# Patient Record
Sex: MALE | Race: WHITE
[De-identification: names, ages, dates, MRNs, and addresses within clinical notes are randomized per-mention and may not be internally consistent; named-entity substitution may affect disease eponyms.]

---

## 2018-08-04 ENCOUNTER — HOSPITAL ENCOUNTER (OUTPATIENT)
Dept: HOSPITAL 97 - 2ND | Age: 63
Setting detail: OBSERVATION
LOS: 1 days | Discharge: HOME | End: 2018-08-05
Attending: SURGERY | Admitting: SURGERY
Payer: COMMERCIAL

## 2018-08-04 VITALS — OXYGEN SATURATION: 93 %

## 2018-08-04 VITALS — BODY MASS INDEX: 33.2 KG/M2

## 2018-08-04 DIAGNOSIS — Z87.891: ICD-10-CM

## 2018-08-04 DIAGNOSIS — K80.00: Primary | ICD-10-CM

## 2018-08-04 LAB — UA COMPLETE W REFLEX CULTURE PNL UR: (no result)

## 2018-08-04 PROCEDURE — 81001 URINALYSIS AUTO W/SCOPE: CPT

## 2018-08-04 PROCEDURE — 0FT44ZZ RESECTION OF GALLBLADDER, PERCUTANEOUS ENDOSCOPIC APPROACH: ICD-10-PCS

## 2018-08-04 PROCEDURE — BF101ZZ FLUOROSCOPY OF BILE DUCTS USING LOW OSMOLAR CONTRAST: ICD-10-PCS

## 2018-08-04 PROCEDURE — 74300 X-RAY BILE DUCTS/PANCREAS: CPT

## 2018-08-04 PROCEDURE — 88304 TISSUE EXAM BY PATHOLOGIST: CPT

## 2018-08-04 RX ADMIN — SODIUM CHLORIDE, SODIUM LACTATE, POTASSIUM CHLORIDE, AND CALCIUM CHLORIDE SCH MLS: .6; .31; .03; .02 INJECTION, SOLUTION INTRAVENOUS at 17:00

## 2018-08-04 NOTE — RAD REPORT
EXAM DESCRIPTION:  RAD - Cholangiogram Oper-Xray Or - 8/4/2018 4:22 pm

 

CLINICAL HISTORY:  LAP KATHE WITH IOC GALLSTONES

 

COMPARISON:  ABDOMEN SINGLE VIEW dated 3/31/2014

 

FINDINGS:  Cystic duct injection was performed by operating surgeon. No retained stone is seen in the
 common duct.

 

Total fluoro time: 0.1 minutes.

 

IMPRESSION:  No evidence of retained common duct stone.

## 2018-08-04 NOTE — P.HP
Certification for Inpatient


Patient admitted to: Observation


With expected LOS: <2 Midnights


Patient will require the following post-hospital care: None


Practitioner: I am a practitioner with admitting privileges, knowledge of 

patient current condition, hospital course, and medical plan of care.


Services: Services provided to patient in accordance with Admission 

requirements found in Title 42 Section 412.3 of the Code of Federal Regulations





Patient History


Date of Service: 08/04/18


Reason for admission: Cholecystitis with cholelithiasis, biliary colic


History of Present Illness: 





This patient has had severe abdominal pain all day yesterday.  Been in the 

right upper quadrant.  Thought it was his heartburn.  Pain was unrelenting and 

he went to another facility for diagnosis and treatment.  He was found have on 

ultrasound a stone stuck in the neck of the gallbladder.


Allergies





HORSE/EQUINE PRODUCT DERIVA Allergy (Severe, Uncoded 08/04/18 09:06)


 Anaphylaxis





Home medications list reviewed: Yes


Home Medications: 








NK [No Home Meds]  08/04/18 








- Past Medical/Surgical History


Has patient received pneumonia vaccine in the past: No


Diabetic: No


-: GERD


-: STERNUM REPAIR AFTER IT SNAPPED IN HALF DURING WATER SKIING





- Social History


Smoking Status: Former smoker


Alcohol use: No


CD- Drugs: No


Caffeine use: Yes


Place of Residence: Home





Review of Systems


Unremarkable


Gastrointestinal: Other (The patient complains of esophageal reflux and 

heartburn for the last 8 years)





Physical Examination





- Vital Signs


Temperature: 97.4 F


Blood Pressure: 150/77


Pulse: 52


Respirations: 18


Pulse Ox (%): 95





- Physical Exam


General: Alert, Mild distress


HEENT: Sclerae nonicteric


Respiratory: Normal air movement


Cardiovascular: Normal S1 S2


Gastrointestinal: Other (Tender in the right upper quadrant)


External genitalia: Normal


Rectal: Deferred





- Studies





Elevated white cell count


Imagings Data: 





CT scan demonstrates large stone stuck in the neck of the gallbladder.





Assessment and Plan





- Plan





Patient has a stone stuck in his gallbladder the right upper quadrant on CT 

scan.  Has received pain medicine.  Pain is currently alleviated, will take to 

the operating room for lap choly with intraoperative cholangiogram.  The risks 

of this procedure have been discussed.  The possibility of bleeding, infection, 

injury to bile ducts blood vessels and intestines has been described.  The 

possible need for an open and/or further surgeries and procedures was 

discussed.  He understands and wants to proceed.


Discharge Plan: Home


Plan to discharge in: 24 Hours





- Advance Directives


Does patient have a Living Will: No


Does patient have a Durable POA for Healthcare: No

## 2018-08-04 NOTE — P.OP
Preoperative diagnosis: Cholecystitis with cholelithiasis, biliary colic


Postoperative diagnosis: Gangrenous cholecystitis with cholelithiasis, biliary 

colic


Primary procedure: Laparoscopic cholecystectomy


Secondary procedure: Intraoperative cholangiogram


Anesthesia: General


Estimated blood loss: Less than 30 cc


Specimen: 1 gallbladder and contents


Operative Technique: 





The patient was brought to the operating room placed supine on the table.  

After the induction of adequate general endotracheal anesthesia, the area of 

the abdomen is prepped with a DuraPrep solution, and draped in the usual 

aseptic manner.





A subumbilical incision was made.  This brought down through the skin and 

subcutaneous tissue.  The Visiport was used to enter the peritoneal cavity and 

created pneumoperitoneum to approximately 12 mm of mercury.  Under direct 

vision a 5 mm trocar was placed in the upper midline, and 2 other 5 mm trocars 

on the right lateral side.  The patient's head was then elevated and rolled 

towards the 's side.  We could see an acutely inflamed gallbladder.  

There was IV is patchy gangrene at the fundus of the gallbladder.  It was 

markedly distended.  We aspirated the common tense of the gallbladder.  A 

grasper was able to be placed on the fundus of the gallbladder.  Another 1 was 

placed down by Jermain's pouch.  Applying lateral traction we were able to 

dissect and expose the cystic duct and artery.  This area was noted be markedly 

edematous with small oozing capillaries to most likely venous congestion The 

artery was dealt with 1st.  It was clipped and divided in the usual manner.  A 

clip was then placed between the gallbladder and the cystic duct.  An opening 

was made into the cystic duct.  We attempted then to pass the cholangiocath 

into the cystic duct. The cholangiogram revealed no evidence of any filling 

defects and we had good flow of contrast into the duodenum.  Clips were now 

placed on the distal portion of the cystic duct.  The cystic duct was then 

divided.  The gallbladder was now dissected free from the liver bed. The liver 

this area was quite friable as was the gallbladder most notably on the 

posterior surface. A small rent was made in the gallbladder during our 

dissection. The gallbladder was finally dissected free from the liver bed,  

placed into an Endo-Catch, and brought out through the umbilical trocar site.  

The gallbladder fossa was inspected to ensure adequate hemostasis.  It was 

noted that we had lost a small stone from the gallbladder.  It was Graston we 

attempted ring out through the 10 mm trocar site. It fragmented and 

necessitated piecemeal extraction from the peritoneal cavity. The area was now 

irrigated with a saline solution. Attention was turned back towards the right 

upper quadrant.  It was irrigated with a saline solution and the irrigant 

aspirated from the peritoneal cavity.  0.25% Marcaine was aerosolized into the 

right upper quadrant and the gallbladder fossa.  The umbilical trocar site was 

now approximated with an Endo Close and an absorbable sutures. The patient also 

has a umbilical hernia. It was necessary to place 4 interrupted sutures in this 

area to approximate the facile defect.  The pneumoperitoneum was then collapsed

, the suture tied, and staples applied to the skin.  A further 0.25% Marcaine 

was injected around are incision sites.





At the end of the procedure the patient was in a stable condition when sent to 

the recovery room.  Needle sponge instrument count were correct.  1 specimen 

was sent for histopathology.


Complications: None


Transferred to: Recovery Room


Condition: Good

## 2018-08-05 VITALS — TEMPERATURE: 97.4 F | SYSTOLIC BLOOD PRESSURE: 123 MMHG | DIASTOLIC BLOOD PRESSURE: 75 MMHG

## 2018-08-05 RX ADMIN — SODIUM CHLORIDE, SODIUM LACTATE, POTASSIUM CHLORIDE, AND CALCIUM CHLORIDE SCH MLS: .6; .31; .03; .02 INJECTION, SOLUTION INTRAVENOUS at 02:31

## 2018-08-05 NOTE — P.PN
Date of Service: 08/05/18





S:  Patient feels well this evening, has been up ambulating, tolerating a diet, 

is anxious to go home.





O:  Incisions are clean, vital signs are stable





A:  Surgically stable





P:  This is known well status post laparoscopic cholecystectomy with 

intraoperative cholangiogram for acute gangrenous cholecystitis.  He has 

received 3 doses of antibiotics.  He is not requiring any pain medicine, is 

awake alert active able to get up and walk around.  He is anxious to be 

discharged.





He will go home today, see me on Friday in my office.  If he has any questions 

or problems, he knows to return to the emergency room, or contact me.

## 2024-10-28 ENCOUNTER — HOSPITAL ENCOUNTER (EMERGENCY)
Dept: HOSPITAL 97 - ER | Age: 69
Discharge: HOME | End: 2024-10-28
Payer: COMMERCIAL

## 2024-10-28 VITALS — TEMPERATURE: 97 F | OXYGEN SATURATION: 100 %

## 2024-10-28 VITALS — DIASTOLIC BLOOD PRESSURE: 83 MMHG | SYSTOLIC BLOOD PRESSURE: 145 MMHG

## 2024-10-28 DIAGNOSIS — R30.0: ICD-10-CM

## 2024-10-28 DIAGNOSIS — R33.9: Primary | ICD-10-CM

## 2024-10-28 LAB
UA COMPLETE W REFLEX CULTURE PNL UR: (no result)
UA DIPSTICK W REFLEX MICRO PNL UR: (no result)

## 2024-10-28 PROCEDURE — 81001 URINALYSIS AUTO W/SCOPE: CPT

## 2024-10-28 PROCEDURE — 99284 EMERGENCY DEPT VISIT MOD MDM: CPT

## 2024-10-28 PROCEDURE — 51702 INSERT TEMP BLADDER CATH: CPT

## 2024-10-28 NOTE — ER
Nurse's Notes                                                                                     

 [facility]                                                                 

Name: Trenton Barcenas                                                                               

Age: 69 yrs                                                                                       

Sex: Male                                                                                         

: 1955                                                                                   

MRN: [id number]                                                                                   

Arrival Date: 10/28/2024                                                                          

Time: 03:38                                                                                       

Account#: [id number]                                                                            

Bed 8                                                                                             

Private MD:                                                                                       

Diagnosis: Retention of urine, unspecified                                                        

                                                                                                  

Presentation:                                                                                     

10/28                                                                                             

03:50 Chief complaint: Patient states: trouble urinating since yesterday with burning and     vc1 

      pain to the tip of my penis. Coronavirus screen: Client denies travel out of the [place]       

      in the last 14 days. At this time, the client does not indicate any symptoms associated     

      with coronavirus-19. Ebola Screen: Patient negative for fever greater than or equal to      

      101.5 degrees Fahrenheit, and additional compatible Ebola Virus Disease symptoms            

      Patient denies exposure to infectious person. Patient denies travel to an                   

      Ebola-affected area in the 21 days before illness onset. No symptoms or risks               

      identified at this time.                                                                    

03:50 Method Of Arrival: Ambulatory                                                           vc1 

03:50 Initial Sepsis Screen: Does the patient meet any 2 criteria? No. Patient's initial      vc1 

      sepsis screen is negative. Does the patient have a suspected source of infection? No.       

      Patient's initial sepsis screen is negative. Risk Assessment: Do you want to hurt           

      yourself or someone else? Patient reports no desire to harm self or others. Onset of        

      symptoms was 2024 at 18:00. Care prior to arrival: None. Activity prior to      

      arrival: None. Mechanism of Injury: No Mechanism of Injury. Transition of care: patient     

      was not received from another setting of care.                                              

03:50 Acuity: GERALD 4                                                                           vc1 

                                                                                                  

Triage Assessment:                                                                                

04:09 General: Appears in no apparent distress. Behavior is calm, cooperative, appropriate    vc1 

      for age. Pain: Complains of pain in meatus Pain does not radiate. Pain currently is 7       

      out of 10 on a pain scale. Quality of pain is described as burning. EENT: No deficits       

      noted. No signs and/or symptoms were reported regarding the EENT system. Neuro: Level       

      of Consciousness is awake, alert, obeys commands, Oriented to person, place, time,          

      situation, Appropriate for age. Cardiovascular: No deficits noted. Capillary refill < 3     

      seconds Patient's skin is warm and dry. Cardiovascular: Heart tones S1 S2 present.          

      Respiratory: Airway is patent Respiratory effort is even, unlabored, Respiratory            

      pattern is regular, symmetrical, Breath sounds are clear. GI: No deficits noted. No         

      signs and/or symptoms were reported involving the gastrointestinal system. : Reports      

      burning with urination, inability to void, since 1800 10/27/24 pain with urination,         

      Pain is 7 out of 10 on a pain scale. Derm: Skin is intact, is healthy with good turgor,     

      Skin is dry, Skin is normal, Skin temperature is warm. Musculoskeletal: Circulation,        

      motion, and sensation intact. Range of motion: intact in all extremities.                   

                                                                                                  

Historical:                                                                                       

- Allergies:                                                                                      

04:03 HORSE/EQUINE PRODUCT DERIVATIVES;                                                       vc1 

- Home Meds:                                                                                      

04:03 levothyroxine oral [Active]; tamsulosin 0.4 mg oral capsule [Active]; "statin" [Active];vc1 

- PMHx:                                                                                           

04:03 Hypothyroidism; Hypercholesterolemia; prostate issue;                                   vc1 

- PSHx:                                                                                           

04:03 None;                                                                                   vc1 

                                                                                                  

- Immunization history:: Client reports having NOT received the Covid vaccine.                    

- Infectious Disease History:: Denies.                                                            

- Family history:: not pertinent.                                                                 

- Social history:: Smoking status: Patient reports use of chewing tobacco. Patient                

  denies any tobacco usage or history of.                                                         

- Hospitalizations: : No recent hospitalization is reported.                                      

                                                                                                  

                                                                                                  

Screenin:08 [facility] ED Fall Risk Assessment (Adult) History of falling in the last 3 months,       vc1 

      including since admission No falls in past 3 months (0 pts) Confusion or Disorientation     

      No (0 pts) Intoxicated or Sedated No (0 pts) Impaired Gait No (0 pts) Mobility Assist       

      Device Used No (0 pt) Altered Elimination Yes (1 pt) Score/Fall Risk Level 3 or more        

      points = High Risk Oriented to surroundings, Maintained a safe environment, Educated pt     

      \T\ family on fall prevention, incl call for assistance when getting out of bed. Abuse      

      screen: Denies threats or abuse. Nutritional screening: No deficits noted. Tuberculosis     

      screening: No symptoms or risk factors identified.                                          

                                                                                                  

Assessment:                                                                                       

04:16 General: See triage assessment.                                                         vc1 

04:26 General: Appears in no apparent distress. comfortable, Behavior is calm, cooperative,   bm8 

      appropriate for age. Pain: Denies pain. Neuro: No deficits noted. Level of                  

      Consciousness is awake, alert, obeys commands, Oriented to person, place, time,             

      situation, Appropriate for age. Cardiovascular: No deficits noted. Denies chest pain,       

      Capillary refill < 3 seconds in bilateral fingers toes Patient's skin is warm and dry.      

      Respiratory: No deficits noted. Airway is patent Respiratory effort is even, unlabored,     

      Respiratory pattern is regular, symmetrical. GI: No signs and/or symptoms were reported     

      involving the gastrointestinal system. : Urine is cloudy. EENT: No signs and/or           

      symptoms were reported regarding the EENT system. Derm: No deficits noted. No signs         

      and/or symptoms reported regarding the dermatologic system. Musculoskeletal: No signs       

      and/or symptoms reported regarding the musculoskeletal system.                              

05:04 Reassessment: Patient appears in no apparent distress at this time. Patient and/or      bm8 

      family updated on plan of care and expected duration. Pain level reassessed. Patient is     

      alert, oriented x 3, equal unlabored respirations, skin warm/dry/pink. Patient denies       

      pain at this time. Patient states feeling better. Patient states symptoms have improved.    

                                                                                                  

Vital Signs:                                                                                      

03:50  / 98; Pulse 75; Resp 15; Temp 97; Pulse Ox 100% ; Weight 95.25 kg; Height 5 ft.  vc1 

      9 in. ; Pain 7/10;                                                                          

04:26  / 96; Pulse 72; Resp 16; Temp 97; Pulse Ox 100% ; Pain 2/10;                     bm8 

05:04  / 83; Pulse 67; Resp 17; Temp 97; Pulse Ox 100% ; Pain 0/10;                     bm8 

03:50 Body Mass Index 31.01 (95.25 kg, 175.26 cm)                                             vc1 

03:50 Pain Scale: Adult                                                                       vc1 

04:26 Pain Scale: Adult                                                                       bm8 

05:04 Pain Scale: Adult                                                                       bm8 

                                                                                                  

Deepali Coma Score:                                                                               

04:26 Eye Response: spontaneous(4). Motor Response: obeys commands(6). Verbal Response:       bm8 

      oriented(5). Total: 15.                                                                     

05:04 Eye Response: spontaneous(4). Motor Response: obeys commands(6). Verbal Response:       bm8 

      oriented(5). Total: 15.                                                                     

                                                                                                  

ED Course:                                                                                        

03:44 Patient arrived in ED.                                                                  gm2 

03:48 César Elder MD is Attending Physician.                                                rn  

04:03 Triage completed.                                                                       vc1 

04:06 Arm band placed on right wrist.                                                         vc1 

04:09 Patient has correct armband on for positive identification. Bed in low position. Call   vc1 

      light in reach. Pulse ox on. NIBP on.                                                       

04:25 Ruddy Morales, RN is Primary Nurse.                                                    bm8 

04:26 No provider procedures requiring assistance completed. Urine collected: Ruano catheter  bm8 

      specimen, cloudy, Amount Returned: 900mL. Ruano cath inserted, using sterile technique,     

      16 Fr., by me, balloon inflated, to gravity drainage, urine specimen collected. Patient     

      tolerated well. switched to leg bag. Patient did not have IV access during this             

      emergency room visit. Patient maintains SpO2 saturation greater than 95% on room air.       

05:00 Provided Education on: ruano bag.                                                       vc1 

                                                                                                  

Administered Medications:                                                                         

05:04 Drug: Ciprofloxacin  mg PO once Route: PO;                                        bm8 

05:04 Follow up: Response: No adverse reaction; Medication administered at discharge.         bm8 

                                                                                                  

                                                                                                  

Medication:                                                                                       

04:15 VIS not applicable for this client.                                                     vc1 

                                                                                                  

Outcome:                                                                                          

04:58 Discharge ordered by MD.                                                                rn  

04:59 Discharged to home ambulatory,                                                          vc1 

04:59 Condition: good                                                                             

04:59 Discharge instructions given to patient, Instructed on discharge instructions, follow       

      up and referral plans. medication usage, Demonstrated understanding of instructions,        

      follow-up care, medications, Prescriptions given X 1,                                       

05:05 Patient left the ED.                                                                    bm8 

                                                                                                  

Signatures:                                                                                       

César Elder MD MD rn Calcote, Vanessa, RN                    RN   vc1                                                  

Juana Delatorre                             [facility]                                                  

Ruddy Morales, RN                      RN   bm8                                                  

                                                                                                  

**************************************************************************************************

## 2024-10-28 NOTE — EDPHYS
Physician Documentation                                                                           

 [facility]                                                                 

Name: Trenton Barcenas                                                                               

Age: 69 yrs                                                                                       

Sex: Male                                                                                         

: 1955                                                                                   

MRN: [id number]                                                                                   

Arrival Date: 10/28/2024                                                                          

Time: 03:38                                                                                       

Account#: [id number]                                                                            

Bed 8                                                                                             

Private MD:                                                                                       

ED Physician César Elder                                                                         

HPI:                                                                                              

10/28                                                                                             

03:54 This 69 yrs old Male presents to ER via Unassigned with complaints of Pain With         rn  

      Urination, Urinary Retention.                                                               

03:54 The patient presents with urinary symptoms, retention, unable to void, Last void was    rn  

      yesterday. Onset: The symptoms/episode began/occurred yesterday. Modifying factors: The     

      symptoms are alleviated by nothing, the symptoms are aggravated by nothing. Severity of     

      symptoms: At their worst the symptoms were moderate, in the emergency department the        

      symptoms are unchanged. The patient has not experienced similar symptoms in the past.       

      Patient reports noticed yesterday was having difficulty urinating, last normal              

      urination was yesterday morning around 9 or 10:00. Throughout the day was exhibiting        

      dribbling urination and then now unable to urinate. Reports lower abdominal fullness as     

      if his bladder was full. No fever or chills. Does report history of UTIs in the past.       

      Unknown if any prostate problems. Denies trauma. Denies hematuria..                         

                                                                                                  

Historical:                                                                                       

- Allergies:                                                                                      

04:03 HORSE/EQUINE PRODUCT DERIVATIVES;                                                       vc1 

- Home Meds:                                                                                      

04:03 levothyroxine oral [Active]; tamsulosin 0.4 mg oral capsule [Active]; "statin" [Active];vc1 

- PMHx:                                                                                           

04:03 Hypothyroidism; Hypercholesterolemia; prostate issue;                                   vc1 

- PSHx:                                                                                           

04:03 None;                                                                                   vc1 

                                                                                                  

- Immunization history:: Client reports having NOT received the Covid vaccine.                    

- Infectious Disease History:: Denies.                                                            

- Family history:: not pertinent.                                                                 

- Social history:: Smoking status: Patient reports use of chewing tobacco. Patient                

  denies any tobacco usage or history of.                                                         

- Hospitalizations: : No recent hospitalization is reported.                                      

                                                                                                  

                                                                                                  

ROS:                                                                                              

03:54 Constitutional: Negative for fever, chills, and weight loss, Cardiovascular: Negative   rn  

      for chest pain, palpitations, and edema, Respiratory: Negative for shortness of breath,     

      cough, wheezing, and pleuritic chest pain, Abdomen/GI: + suprapubic pain Back: Negative     

      for injury and pain, : + unable to urinate                                                

                                                                                                  

Exam:                                                                                             

03:54 Constitutional:  This is a well developed, well nourished patient who is awake, alert,  rn  

      and in no acute distress.  Ambulatory to room without difficulty or assistance              

      Cardiovascular:  Regular rate and rhythm.  No pulse deficits. Abdomen/GI:  Soft,            

      nontender, mild discomfort upon suprapubic palpation                                        

                                                                                                  

Vital Signs:                                                                                      

03:50  / 98; Pulse 75; Resp 15; Temp 97; Pulse Ox 100% ; Weight 95.25 kg; Height 5 ft.  vc1 

      9 in. ; Pain 7/10;                                                                          

04:26  / 96; Pulse 72; Resp 16; Temp 97; Pulse Ox 100% ; Pain 2/10;                     bm8 

05:04  / 83; Pulse 67; Resp 17; Temp 97; Pulse Ox 100% ; Pain 0/10;                     bm8 

03:50 Body Mass Index 31.01 (95.25 kg, 175.26 cm)                                             vc1 

03:50 Pain Scale: Adult                                                                       vc1 

04:26 Pain Scale: Adult                                                                       bm8 

05:04 Pain Scale: Adult                                                                       bm8 

                                                                                                  

Deepali Coma Score:                                                                               

04:26 Eye Response: spontaneous(4). Motor Response: obeys commands(6). Verbal Response:       bm8 

      oriented(5). Total: 15.                                                                     

05:04 Eye Response: spontaneous(4). Motor Response: obeys commands(6). Verbal Response:       bm8 

      oriented(5). Total: 15.                                                                     

                                                                                                  

MDM:                                                                                              

03:48 Medical Screening Exam initiated                                                        rn  

04:56 Differential diagnosis: UTI, urinary retention, prostatitis. Data reviewed: vital       rn  

      signs, nurses notes, lab test result(s), and as a result, I will discharge patient.         

      Counseling: I had a detailed discussion with the patient and/or guardian regarding the      

      historical points, exam findings, and any diagnostic results supporting the                 

      discharge/admit diagnosis, lab results, the need for outpatient follow up, to return to     

      the emergency department if symptoms worsen or persist or if there are any questions or     

      concerns that arise at home. Response to treatment: the patient's symptoms have             

      markedly improved after treatment, and as a result, I will discharge patient. Special       

      discussion: I discussed with the patient/guardian in detail that at this point there is     

      no indication for admission to the hospital. It is understood, however, that if the         

      symptoms persist or worsen the patient needs to return immediately for re-evaluation.       

      Based on the history and exam findings, there is no indication for further emergent         

      testing or inpatient evaluation. I discussed with the patient/guardian the need to see      

      the urologist for further evaluation of the symptoms. ED course: Patient markedly           

      improved after Richard catheter insertion. No hematuria noted. Good drainage. UA negative     

      for infection. Patient already on Flomax. Will place on antibiotics and he has a            

      urologist and will try to get in as soon as possible. Return precautions given and          

      understood..                                                                                

                                                                                                  

10/28                                                                                             

03:48 Order name: Urinalysis w/ reflexes; Complete Time: 04:48                                rn  

10/28                                                                                             

03:54 Order name: Richard Leg Bag; Complete Time: 04:29                                         rn  

                                                                                                  

Administered Medications:                                                                         

05:04 Drug: Ciprofloxacin  mg PO once Route: PO;                                        bm8 

05:04 Follow up: Response: No adverse reaction; Medication administered at discharge.         bm8 

                                                                                                  

                                                                                                  

Disposition Summary:                                                                              

10/28/24 04:58                                                                                    

Discharge Ordered                                                                                 

 Notes:       Location: Home                                                                        
  rn

      Problem: new                                                                            rn  

      Symptoms: have improved                                                                 rn  

      Condition: Stable                                                                       rn  

      Diagnosis                                                                                   

        - Retention of urine, unspecified                                                     rn  

      Followup:                                                                               rn  

        - With: Private Physician                                                                  

        - When: As needed                                                                          

        - Reason: Recheck today's complaints, Re-evaluation by your physician                      

      Discharge Instructions:                                                                     

        - Discharge Summary Sheet                                                             rn  

        - Indwelling Urinary Catheter Care, Adult                                             rn  

        - Acute Urinary Retention, Male                                                       rn  

      Forms:                                                                                      

        - Medication Reconciliation Form                                                      rn  

        - Antibiotic Education                                                                rn  

        - Prescription Opioid Use                                                             rn  

        - Patient Portal Instructions                                                         rn  

        - Leadership Thank You Letter                                                         rn  

      Prescriptions:                                                                              

        - Cipro 500 mg Oral Tablet                                                                 

            - take 1 tablet ORAL route every 12 hours for 10 days; 20 tablet; Refills: 0,     rn  

      Product Selection Permitted                                                                 

Signatures:                                                                                       

Dispatcher [employer]                           EDCésar Roche MD MD rn Calcote, Vanessa, RN                    RN   vc1                                                  

Ruddy Morales, RN                      RN   bm8                                                  

                                                                                                  

**************************************************************************************************

## 2024-11-10 ENCOUNTER — HOSPITAL ENCOUNTER (EMERGENCY)
Dept: HOSPITAL 97 - ER | Age: 69
Discharge: HOME | End: 2024-11-10
Payer: COMMERCIAL

## 2024-11-10 VITALS — DIASTOLIC BLOOD PRESSURE: 91 MMHG | SYSTOLIC BLOOD PRESSURE: 121 MMHG | OXYGEN SATURATION: 99 %

## 2024-11-10 VITALS — TEMPERATURE: 97.9 F

## 2024-11-10 DIAGNOSIS — N39.0: Primary | ICD-10-CM

## 2024-11-10 DIAGNOSIS — E03.9: ICD-10-CM

## 2024-11-10 DIAGNOSIS — E78.00: ICD-10-CM

## 2024-11-10 LAB
ALBUMIN SERPL BCP-MCNC: 3.4 G/DL (ref 3.4–5)
ALBUMIN/GLOB SERPL: 0.9 {RATIO} (ref 1.1–1.8)
ALP SERPL-CCNC: 71 U/L (ref 45–117)
ALT SERPL W P-5'-P-CCNC: 54 U/L (ref 16–61)
ANION GAP SERPL CALC-SCNC: 6.8 MEQ/L (ref 5–15)
AST SERPL W P-5'-P-CCNC: 28 U/L (ref 15–37)
BUN BLD-MCNC: 15 MG/DL (ref 7–18)
GLOBULIN SER CALC-MCNC: 4 G/DL (ref 2.3–3.5)
GLUCOSE SERPLBLD-MCNC: 115 MG/DL (ref 74–106)
HCT VFR BLD CALC: 41.8 % (ref 39.6–49)
HGB BLD-MCNC: 13.8 G/DL (ref 13.6–17.9)
LIPASE SERPL-CCNC: 28 U/L (ref 13–75)
LYMPHOCYTES # SPEC AUTO: 2.3 K/UL (ref 0.7–4.9)
MCH RBC QN AUTO: 30.8 PG (ref 27–35)
MCHC RBC AUTO-ENTMCNC: 33.1 G/DL (ref 32–36)
MCV RBC: 93 FL (ref 80–100)
NRBC # BLD: 0 10*3/UL (ref 0–0)
NRBC BLD AUTO-RTO: 0 % (ref 0–0)
PMV BLD: 7.6 FL (ref 7.6–11.3)
POTASSIUM SERPL-SCNC: 3.8 MEQ/L (ref 3.5–5.1)
RBC # BLD: 4.5 M/UL (ref 4.33–5.43)
UA COMPLETE W REFLEX CULTURE PNL UR: (no result)
UA DIPSTICK W REFLEX MICRO PNL UR: (no result)
WBC # BLD AUTO: 8.7 THOU/UL (ref 4.3–10.9)

## 2024-11-10 PROCEDURE — 87186 SC STD MICRODIL/AGAR DIL: CPT

## 2024-11-10 PROCEDURE — 87077 CULTURE AEROBIC IDENTIFY: CPT

## 2024-11-10 PROCEDURE — 36415 COLL VENOUS BLD VENIPUNCTURE: CPT

## 2024-11-10 PROCEDURE — 96366 THER/PROPH/DIAG IV INF ADDON: CPT

## 2024-11-10 PROCEDURE — 81001 URINALYSIS AUTO W/SCOPE: CPT

## 2024-11-10 PROCEDURE — 96375 TX/PRO/DX INJ NEW DRUG ADDON: CPT

## 2024-11-10 PROCEDURE — 83690 ASSAY OF LIPASE: CPT

## 2024-11-10 PROCEDURE — 80053 COMPREHEN METABOLIC PANEL: CPT

## 2024-11-10 PROCEDURE — 96365 THER/PROPH/DIAG IV INF INIT: CPT

## 2024-11-10 PROCEDURE — 87088 URINE BACTERIA CULTURE: CPT

## 2024-11-10 PROCEDURE — 87086 URINE CULTURE/COLONY COUNT: CPT

## 2024-11-10 PROCEDURE — 99284 EMERGENCY DEPT VISIT MOD MDM: CPT

## 2024-11-10 PROCEDURE — 74177 CT ABD & PELVIS W/CONTRAST: CPT

## 2024-11-10 PROCEDURE — 85025 COMPLETE CBC W/AUTO DIFF WBC: CPT

## 2024-11-10 PROCEDURE — 96361 HYDRATE IV INFUSION ADD-ON: CPT

## 2025-03-12 LAB
ANION GAP SERPL CALC-SCNC: 4.4 MEQ/L (ref 5–15)
BUN BLD-MCNC: 22 MG/DL (ref 7–18)
GLUCOSE SERPLBLD-MCNC: 100 MG/DL (ref 74–106)
HCT VFR BLD CALC: 43.3 % (ref 39.6–49)
HGB BLD-MCNC: 14.3 G/DL (ref 13.6–17.9)
INR BLD: 0.99
LYMPHOCYTES # SPEC AUTO: 1.9 K/UL (ref 0.7–4.9)
MCH RBC QN AUTO: 30.7 PG (ref 27–35)
MCHC RBC AUTO-ENTMCNC: 33.1 G/DL (ref 32–36)
MCV RBC: 92.8 FL (ref 80–100)
NRBC # BLD: 0 10*3/UL (ref 0–0)
NRBC BLD AUTO-RTO: 0.1 % (ref 0–0)
PMV BLD: 7.7 FL (ref 7.6–11.3)
POTASSIUM SERPL-SCNC: 4.4 MEQ/L (ref 3.5–5.1)
PROTHROMBIN TIME: 11.3 SECONDS (ref 10–13)
RBC # BLD: 4.67 M/UL (ref 4.33–5.43)
WBC # BLD AUTO: 7.9 THOU/UL (ref 4.3–10.9)

## 2025-03-12 NOTE — RAD REPORT
EXAMINATION: TWO VIEW CHEST XR



CLINICAL INDICATION: Pre-op pending urolift



TECHNIQUE: 2 views of the chest was performed. 



COMPARISON: 11/24/2017



FINDINGS:

Mild COPD. 1 cm left midlung pulmonary nodule is seen. The heart is upper limit of normal in size. No
 displaced fractures evident.



IMPRESSION:

 

1 cm nodule is present left midlung. Recommend CT chest for further evaluation.







Reported By: Krunal Allen 

Electronically Signed:  3/12/2025 10:46 AM

## 2025-03-14 NOTE — EKG
Test Date:    2025-03-12               Test Time:    10:22:20

Technician:   RAE                                     

                                                     

MEASUREMENT RESULTS:                                       

Intervals:                                           

Rate:         68                                     

UT:           184                                    

QRSD:         98                                     

QT:           378                                    

QTc:          401                                    

Axis:                                                

P:            65                                     

UT:           184                                    

QRS:          55                                     

T:            46                                     

                                                     

INTERPRETIVE STATEMENTS:                                       

                                                     

Normal sinus rhythm

Normal ECG

Compared to ECG 11/24/2017 16:43:37

No significant changes



Electronically Signed On 03-14-25 14:42:39 CDT by Tio Philippe

## 2025-03-18 ENCOUNTER — HOSPITAL ENCOUNTER (OUTPATIENT)
Dept: HOSPITAL 97 - OR | Age: 70
Discharge: HOME | End: 2025-03-18
Attending: UROLOGY
Payer: COMMERCIAL

## 2025-03-18 VITALS — OXYGEN SATURATION: 98 % | TEMPERATURE: 97 F | SYSTOLIC BLOOD PRESSURE: 115 MMHG | DIASTOLIC BLOOD PRESSURE: 73 MMHG

## 2025-03-18 DIAGNOSIS — N13.8: ICD-10-CM

## 2025-03-18 DIAGNOSIS — N35.912: ICD-10-CM

## 2025-03-18 DIAGNOSIS — N40.1: Primary | ICD-10-CM

## 2025-03-18 DIAGNOSIS — N21.0: ICD-10-CM

## 2025-03-18 PROCEDURE — 71046 X-RAY EXAM CHEST 2 VIEWS: CPT

## 2025-03-18 PROCEDURE — 36415 COLL VENOUS BLD VENIPUNCTURE: CPT

## 2025-03-18 PROCEDURE — 87086 URINE CULTURE/COLONY COUNT: CPT

## 2025-03-18 PROCEDURE — 52441 CYSTO INSJ TRNSPRSTC 1 IMPLT: CPT

## 2025-03-18 PROCEDURE — 93005 ELECTROCARDIOGRAM TRACING: CPT

## 2025-03-18 PROCEDURE — 82360 CALCULUS ASSAY QUANT: CPT

## 2025-03-18 PROCEDURE — 85610 PROTHROMBIN TIME: CPT

## 2025-03-18 PROCEDURE — 0TFB8ZZ FRAGMENTATION IN BLADDER, VIA NATURAL OR ARTIFICIAL OPENING ENDOSCOPIC: ICD-10-PCS

## 2025-03-18 PROCEDURE — 0T7D8DZ DILATION OF URETHRA WITH INTRALUMINAL DEVICE, VIA NATURAL OR ARTIFICIAL OPENING ENDOSCOPIC: ICD-10-PCS

## 2025-03-18 PROCEDURE — 88300 SURGICAL PATH GROSS: CPT

## 2025-03-18 PROCEDURE — 80048 BASIC METABOLIC PNL TOTAL CA: CPT

## 2025-03-18 PROCEDURE — 85025 COMPLETE CBC W/AUTO DIFF WBC: CPT

## 2025-03-18 PROCEDURE — 87088 URINE BACTERIA CULTURE: CPT

## 2025-03-18 PROCEDURE — 52442 CYSTO INS TRNSPRSTC IMPLT EA: CPT

## 2025-03-18 PROCEDURE — 52317 REMOVE BLADDER STONE: CPT

## 2025-03-18 RX ADMIN — HYDROMORPHONE HYDROCHLORIDE ONE MG: 1 INJECTION, SOLUTION INTRAMUSCULAR; INTRAVENOUS; SUBCUTANEOUS at 10:28

## 2025-03-18 RX ADMIN — SODIUM CHLORIDE, SODIUM LACTATE, POTASSIUM CHLORIDE, AND CALCIUM CHLORIDE ONE MLS: .6; .31; .03; .02 INJECTION, SOLUTION INTRAVENOUS at 07:30

## 2025-03-18 RX ADMIN — ACETAMINOPHEN AND CODEINE PHOSPHATE ONE TAB: 300; 30 TABLET ORAL at 11:20

## 2025-03-18 RX ADMIN — HYDROMORPHONE HYDROCHLORIDE ONE MG: 1 INJECTION, SOLUTION INTRAMUSCULAR; INTRAVENOUS; SUBCUTANEOUS at 10:16

## 2025-03-18 RX ADMIN — CEFAZOLIN ONE GM: 2 INJECTION, POWDER, FOR SOLUTION INTRAMUSCULAR; INTRAVENOUS at 08:55

## 2025-03-18 NOTE — P.OP
Date of Service: 03/18/25


Preoperative diagnoses:


Bladder calculi - three, each approximately 2 cm x 0.5 cm


BPH with lower urinary tract obstruction and symptoms





Postoperative diagnoses:


Bladder calculi - three, each approximately 2 cm x 0.5 cm


BPH with lower urinary tract obstruction and symptoms


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





Principal procedures:


Cystolitholapaxy using holmium laser and stone  device


Prostatic urethral lift/UroLift with 8 implants successfully placed


Bladder irrigation


20 [place] urethral Richard catheter placement





Indication for procedure:


69-year-old gentleman presented to the urology clinic with gross hematuria and 

obstructive LUTS.  He underwent evaluation revealing the presence of multiple 

bladder calculi of uncertain etiology contributing to obstruction in the setting

of significant BPH.  He was counseled on the need for management of the bladder 

calculi and recommended to also manage his obstruction due to BPH, as this may 

be contributory to the formation of the bladder calculi.  He elected to proceed 

accordingly with the UroLift.





Procedure note:


The patient was consented in the preoperative holding area before being 

transferred to the operative suite where general anesthesia was induced.  He was

given Ancef 2 g IV antimicrobial prophylaxis, and pneumoboots were provided for 

DVT prophylaxis.  He was placed in the lithotomy position, padded and secured to

the table appropriately.  His genitalia was prepped with Hibiclens and he was 

draped in standard fashion.  The case was begun using the 22 [place] rigid 

cystoscope to traverse the urethra and navigate beyond a strictured area within 

the bulbar urethra with a slight degree of difficulty, but ultimately I was able

to navigate the scope sufficient to dilate beyond the stricture narrowing and 

navigate through the prostatic urethra and enter into his bladder.  Once in the 

bladder, I decompressed it of fluid and urine and surveyed identifying the 

foreign bladder calculi previously observed.  Additionally, a smaller calculus 

was present within the prostatic urethra, likely contributing to his pain.  I 

then utilized the stone  device via the 22 [place] cystoscope to attempt 

to crush the stones, but this was not feasible; so I removed the cystoscope and 

the stone  device and then utilized urethral sounds to dilate his meatus 

and fossa navicularis to 30 [place].  I then utilized a visual obturator and the 

26 [place] monopolar resectoscope to traverse the urethra and into his bladder 

with relative ease, again meeting a point of resistance in the bulbar urethra 

that I was able to suppress.  Upon entry into the bladder, I then utilized a 550

nm laser fiber at a power setting of 1 J and 25 Hz to fragment the stones into 

fragments small enough to be removed using the [place] evacuator.  After I removed

most of the stone fragments, I then surveyed the bladder again and utilized the 

laser a second time fragmenting additional larger fragments.  I then utilized 

the stone  device and was successful at crushing some of the fragments 

into pieces small enough to be removed subsequently with additional Ilich 

evacuation.  Once the bladder was free of all stone fragments and dust, I then 

decompressed his bladder and removed the resectoscope.


I then utilized the 20 [place] sheath and a visual obturator to traverse the 

urethra and into his bladder.  I then switched the UroLift visual obturator for 

the first implant delivery device and an implant.  The first implant was 

targeted at the bladder neck on the patient's left side where.  At around the 3 

o'clock position, I angled the delivery device about 15 degrees laterally 

against the tissue.  I pulled the trigger once deploying the needle through the 

substance of the prostate before compressing the tissue completely and an 

additional 15 degrees to ensure the tip of the needle situated outside of the 

capsular surface.  I then pulled the trigger a second time delivering the 

capsular tab and beginning to retract the needle.  I third pule of the trigger 

did completely retract the needle and began to tension the suture.  I then 

advanced the scope back toward the midline and 2 to 3 mm toward the bladder neck

opening until the white line of a monofilament was centered in the delivery bay.

 At this point, I pulled the trigger a fourth time delivering the urethral end 

piece and tailoring the suture.  This did nicely retract that adenoma on the 

left side.  I then navigated the device back into the patient's bladder and 

switched it for a new implant which was then subsequently targeted on the 

patient's right side, this time sweeping the tissue anterolaterally at around 

the 11 o'clock position.  This did beautifully lateralized the tissue, but now 

the tissue overhanging at the bladder neck from the left became more apparent; 

so a third implant was placed at the bladder neck on the left more anteriorly at

around the 1 o'clock position, nicely elevating and opening the channel at the 

bladder neck.  I then placed a fourth implant at the level of the verumontanum 

and the apical tissue on the left, and a 5th implant successfully at the 9 

o'clock position on the right at the level of the verumontanum.  I then surveyed

the channel created and noted some residual lateral lobar intrusion emanating 

from the right side in the mid zone of the prostate; so I placed a 6th implant 

into that tissue.  This did beautifully lateralized the tissue, but so much so 

that it now created a smile with the prostate narrowing the opening at the 

bladder neck more than desired.  As a result, I utilized a stent device to see 

if I could grasp that tissue and move it off to the side opening the bladder 

neck by placing the implant more inferiorly.  I discovered that I was able to 

navigate the tissue down and over to the patient's left side with the implant 

ultimately placed at around the 4 o'clock position in the left mid zone to the 

bladder neck aspect of the prostate, but this did successfully open up this mild

that had been created back to a more beautiful rounded anterior channel.  Survey

of the channel created did suggest a slight degree of residual adenomatous 

intrusion emanating from the apical mid gland on the left; so I placed an 8th 

and final implant into that tissue creating a beautiful continuous anterior 

channel.  There was a degree of hematuria emanating from the prostate; so I 

placed a 20 [place] urethral Richard catheter via his urethra into his bladder with

ease, and then I irrigated his bladder with the Ilich evacuator via the 

catheter.  The drainage was light pink.  As a result, the catheter was connected

to a leg bag, and the patient was taken out of the lithotomy position.  He was 

then awakened from general anesthesia before being transferred to a stretcher.  

He was then transferred to the recovery room in good condition.





Complications: None





Discharge disposition:


Beautiful continuous anterior channel created with aid implants used in this 

patient.  Subsequent follow-up should be established in about 1 month's time.  

At that point, we will plan 24-hour urine metabolic profile assessment to figure

out why he formed those 3 large bladder calculi.  We will also reassess his 

symptoms at that time.